# Patient Record
Sex: MALE | Race: WHITE | NOT HISPANIC OR LATINO | Employment: STUDENT | URBAN - METROPOLITAN AREA
[De-identification: names, ages, dates, MRNs, and addresses within clinical notes are randomized per-mention and may not be internally consistent; named-entity substitution may affect disease eponyms.]

---
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
## 2025-01-03 ENCOUNTER — TELEPHONE (OUTPATIENT)
Age: 20
End: 2025-01-03

## 2025-01-03 NOTE — TELEPHONE ENCOUNTER
The patient's mother called to inform the patient that the doctor who prescribed him the medication VYVANSE 40 MG capsule for HDHD will not be in network with the health insurance and she wants to know if Dr. Granados can prescribe the medication. The patient does not need the medication right now.    Please Advice

## 2025-01-03 NOTE — TELEPHONE ENCOUNTER
Please let him know that I can refill the medication for him.  Please asked him to contact us for refills when he is ready.  Thank you.

## 2025-06-13 ENCOUNTER — TELEPHONE (OUTPATIENT)
Age: 20
End: 2025-06-13

## 2025-06-13 NOTE — TELEPHONE ENCOUNTER
Pt injured his ankle 2 weeks ago in college and went to an Urgent care out there.  He is still having a hard time putting any pressure on it.  Mom is requesting a call back on further recommendations. Please advise.

## 2025-06-17 ENCOUNTER — OFFICE VISIT (OUTPATIENT)
Dept: OBGYN CLINIC | Facility: CLINIC | Age: 20
End: 2025-06-17
Payer: COMMERCIAL

## 2025-06-17 ENCOUNTER — APPOINTMENT (OUTPATIENT)
Dept: RADIOLOGY | Facility: AMBULARY SURGERY CENTER | Age: 20
End: 2025-06-17
Attending: PHYSICAL MEDICINE & REHABILITATION
Payer: COMMERCIAL

## 2025-06-17 VITALS — BODY MASS INDEX: 22.33 KG/M2 | WEIGHT: 134 LBS | HEIGHT: 65 IN

## 2025-06-17 DIAGNOSIS — M25.572 PAIN, JOINT, ANKLE AND FOOT, LEFT: ICD-10-CM

## 2025-06-17 DIAGNOSIS — S93.622A LISFRANC'S SPRAIN, LEFT, INITIAL ENCOUNTER: Primary | ICD-10-CM

## 2025-06-17 PROCEDURE — 99214 OFFICE O/P EST MOD 30 MIN: CPT | Performed by: PHYSICAL MEDICINE & REHABILITATION

## 2025-06-17 PROCEDURE — 73610 X-RAY EXAM OF ANKLE: CPT

## 2025-06-17 PROCEDURE — 73630 X-RAY EXAM OF FOOT: CPT
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
## 2025-07-11 ENCOUNTER — OFFICE VISIT (OUTPATIENT)
Dept: OBGYN CLINIC | Facility: CLINIC | Age: 20
End: 2025-07-11
Payer: COMMERCIAL

## 2025-07-11 DIAGNOSIS — M25.572 PAIN, JOINT, ANKLE AND FOOT, LEFT: ICD-10-CM

## 2025-07-11 DIAGNOSIS — S93.622A LISFRANC'S SPRAIN, LEFT, INITIAL ENCOUNTER: Primary | ICD-10-CM

## 2025-07-11 PROCEDURE — 99213 OFFICE O/P EST LOW 20 MIN: CPT | Performed by: PHYSICAL MEDICINE & REHABILITATION
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
## 2025-08-18 ENCOUNTER — NURSE TRIAGE (OUTPATIENT)
Age: 20
End: 2025-08-18